# Patient Record
Sex: FEMALE | Race: WHITE | NOT HISPANIC OR LATINO | Employment: FULL TIME | URBAN - METROPOLITAN AREA
[De-identification: names, ages, dates, MRNs, and addresses within clinical notes are randomized per-mention and may not be internally consistent; named-entity substitution may affect disease eponyms.]

---

## 2023-12-13 ENCOUNTER — EH NON-PROVIDER (OUTPATIENT)
Dept: OCCUPATIONAL MEDICINE | Facility: CLINIC | Age: 23
End: 2023-12-13
Payer: COMMERCIAL

## 2023-12-13 ENCOUNTER — HOSPITAL ENCOUNTER (OUTPATIENT)
Facility: MEDICAL CENTER | Age: 23
End: 2023-12-13
Attending: PREVENTIVE MEDICINE
Payer: COMMERCIAL

## 2023-12-13 ENCOUNTER — EMPLOYEE HEALTH (OUTPATIENT)
Dept: OCCUPATIONAL MEDICINE | Facility: CLINIC | Age: 23
End: 2023-12-13
Payer: COMMERCIAL

## 2023-12-13 VITALS
TEMPERATURE: 97.3 F | HEIGHT: 63 IN | HEART RATE: 86 BPM | RESPIRATION RATE: 16 BRPM | OXYGEN SATURATION: 98 % | WEIGHT: 175 LBS | DIASTOLIC BLOOD PRESSURE: 62 MMHG | SYSTOLIC BLOOD PRESSURE: 100 MMHG | BODY MASS INDEX: 31.01 KG/M2

## 2023-12-13 DIAGNOSIS — Z02.89 VISIT FOR OCCUPATIONAL HEALTH EXAMINATION: ICD-10-CM

## 2023-12-13 DIAGNOSIS — Z02.89 VISIT FOR OCCUPATIONAL HEALTH EXAMINATION: Primary | ICD-10-CM

## 2023-12-13 LAB
AMP AMPHETAMINE: NORMAL
BAR BARBITURATES: NORMAL
BZO BENZODIAZEPINES: NORMAL
COC COCAINE: NORMAL
INT CON NEG: NORMAL
INT CON POS: NORMAL
MDMA ECSTASY: NORMAL
MET METHAMPHETAMINES: NORMAL
MTD METHADONE: NORMAL
OPI OPIATES: NORMAL
OXY OXYCODONE: NORMAL
PCP PHENCYCLIDINE: NORMAL
POC URINE DRUG SCREEN OCDRS: NORMAL
THC: NORMAL

## 2023-12-13 PROCEDURE — 8915 PR COMPREHENSIVE PHYSICAL: Performed by: PREVENTIVE MEDICINE

## 2023-12-13 PROCEDURE — 80305 DRUG TEST PRSMV DIR OPT OBS: CPT | Performed by: PREVENTIVE MEDICINE

## 2023-12-13 PROCEDURE — 86787 VARICELLA-ZOSTER ANTIBODY: CPT | Performed by: PREVENTIVE MEDICINE

## 2023-12-13 PROCEDURE — 86480 TB TEST CELL IMMUN MEASURE: CPT | Performed by: PREVENTIVE MEDICINE

## 2023-12-13 PROCEDURE — 90715 TDAP VACCINE 7 YRS/> IM: CPT | Performed by: NURSE PRACTITIONER

## 2023-12-13 NOTE — PROGRESS NOTES
Pre Employment Drug Screen Completed.   Tb & VZV draw  Tdap admin  Docs: MMR and Hep B   Signed COVID declination since no proof was brought in and is not sure if she can get it.   No mask fit is required at this time.

## 2023-12-15 LAB
GAMMA INTERFERON BACKGROUND BLD IA-ACNC: 0.04 IU/ML
M TB IFN-G BLD-IMP: NEGATIVE
M TB IFN-G CD4+ BCKGRND COR BLD-ACNC: 0 IU/ML
MITOGEN IGNF BCKGRD COR BLD-ACNC: 9.72 IU/ML
QFT TB2 - NIL TBQ2: -0.01 IU/ML

## 2023-12-16 LAB — VZV IGG SER IA-ACNC: 1366 IV

## 2024-02-12 ENCOUNTER — HOSPITAL ENCOUNTER (OUTPATIENT)
Facility: MEDICAL CENTER | Age: 24
End: 2024-02-12
Attending: OBSTETRICS & GYNECOLOGY
Payer: COMMERCIAL

## 2024-02-12 ENCOUNTER — INITIAL PRENATAL (OUTPATIENT)
Dept: OBGYN | Facility: CLINIC | Age: 24
End: 2024-02-12
Payer: COMMERCIAL

## 2024-02-12 VITALS — DIASTOLIC BLOOD PRESSURE: 67 MMHG | WEIGHT: 175 LBS | SYSTOLIC BLOOD PRESSURE: 108 MMHG | BODY MASS INDEX: 31 KG/M2

## 2024-02-12 DIAGNOSIS — Z34.90 PREGNANCY, UNSPECIFIED GESTATIONAL AGE: ICD-10-CM

## 2024-02-12 DIAGNOSIS — O21.0 MORNING SICKNESS: ICD-10-CM

## 2024-02-12 DIAGNOSIS — F32.A ANXIETY AND DEPRESSION: ICD-10-CM

## 2024-02-12 DIAGNOSIS — F41.9 ANXIETY AND DEPRESSION: ICD-10-CM

## 2024-02-12 DIAGNOSIS — Z12.4 SCREENING FOR CERVICAL CANCER: ICD-10-CM

## 2024-02-12 PROCEDURE — 3074F SYST BP LT 130 MM HG: CPT | Performed by: OBSTETRICS & GYNECOLOGY

## 2024-02-12 PROCEDURE — 87491 CHLMYD TRACH DNA AMP PROBE: CPT

## 2024-02-12 PROCEDURE — 88142 CYTOPATH C/V THIN LAYER: CPT

## 2024-02-12 PROCEDURE — 76830 TRANSVAGINAL US NON-OB: CPT | Performed by: OBSTETRICS & GYNECOLOGY

## 2024-02-12 PROCEDURE — 87591 N.GONORRHOEAE DNA AMP PROB: CPT

## 2024-02-12 PROCEDURE — 3078F DIAST BP <80 MM HG: CPT | Performed by: OBSTETRICS & GYNECOLOGY

## 2024-02-12 PROCEDURE — 0500F INITIAL PRENATAL CARE VISIT: CPT | Performed by: OBSTETRICS & GYNECOLOGY

## 2024-02-12 RX ORDER — ONDANSETRON 4 MG/1
4 TABLET, FILM COATED ORAL EVERY 4 HOURS PRN
Qty: 20 TABLET | Refills: 1 | Status: SHIPPED | OUTPATIENT
Start: 2024-02-12

## 2024-02-12 RX ORDER — FERROUS SULFATE 325(65) MG
325 TABLET ORAL DAILY
COMMUNITY

## 2024-02-12 ASSESSMENT — EDINBURGH POSTNATAL DEPRESSION SCALE (EPDS)
I HAVE BEEN SO UNHAPPY THAT I HAVE HAD DIFFICULTY SLEEPING: NOT VERY OFTEN
TOTAL SCORE: 14
I HAVE FELT SAD OR MISERABLE: NOT VERY OFTEN
I HAVE BLAMED MYSELF UNNECESSARILY WHEN THINGS WENT WRONG: YES, MOST OF THE TIME
THINGS HAVE BEEN GETTING ON TOP OF ME: YES, SOMETIMES I HAVEN'T BEEN COPING AS WELL AS USUAL
I HAVE LOOKED FORWARD WITH ENJOYMENT TO THINGS: AS MUCH AS I EVER DID
THE THOUGHT OF HARMING MYSELF HAS OCCURRED TO ME: HARDLY EVER
I HAVE FELT SCARED OR PANICKY FOR NO GOOD REASON: YES, SOMETIMES
I HAVE BEEN ABLE TO LAUGH AND SEE THE FUNNY SIDE OF THINGS: NOT QUITE SO MUCH NOW
I HAVE BEEN ANXIOUS OR WORRIED FOR NO GOOD REASON: YES, VERY OFTEN
I HAVE BEEN SO UNHAPPY THAT I HAVE BEEN CRYING: NO, NEVER

## 2024-02-12 NOTE — PROGRESS NOTES
Establish Pregnancy Visit    CC: First OB Visit    HPI: Patient is a 23 y.o.  at 9w3d who presents for her first OB visit.  She is not yet feeling fetal movement. She denies vaginal bleeding, denies leakage of fluid, denies contractions. She reports some nausea and vomiting and would like a prescription for nausea medication. A script for zofran was provided, she was counseled to take unisom and B6 at night. She denies headaches or urinary symptoms.     DATING:   Estimated Date of Delivery: 2024 Dated by 9w3d ultrasound performed today (unknown LMP)    GYN HX:   Last Pap: Performed today, she has not had a prior pap  Hx Moderate or Severe Dysplasia : no  Hx STD : no    OBSTETRIC HISTORY:  OB History    Para Term  AB Living   3 2 2     2   SAB IAB Ectopic Molar Multiple Live Births             2      # Outcome Date GA Lbr Eyad/2nd Weight Sex Delivery Anes PTL Lv   3 Current            2 Term 22 38w0d  7 lb 6 oz F Vag-Spont None  DIANE      Birth Comments: Heavy bleeding when delivering placenta   1 Term 21 40w4d  6 lb 7 oz M Vag-Spont None  DIANE       MEDICAL HISTORY:  Past Medical History:   Diagnosis Date    Vaginismus        MEDICATIONS:  Current Outpatient Medications on File Prior to Visit   Medication Sig Dispense Refill    Prenatal MV-Min-Fe Fum-FA-DHA (PRENATAL 1 PO) Take  by mouth.      ferrous sulfate 325 (65 Fe) MG tablet Take 325 mg by mouth every day.       No current facility-administered medications on file prior to visit.       FAMILY HISTORY:  Family History   Problem Relation Age of Onset    No Known Problems Mother     No Known Problems Father        SURGICAL HISTORY:  Past Surgical History:   Procedure Laterality Date    DENTAL EXTRACTION(S)  2016    [place] teeth removal       ALLERGIES / REACTIONS:  Allergies   Allergen Reactions    Amoxicillin      Hives                SOCIAL HISTORY:   reports that she has never smoked. She has never used smokeless tobacco.  She reports that she does not drink alcohol and does not use drugs.    ROS:   Gen: no fevers or chills, no significant weight loss or gain, excessive fatigue  Respiratory:  no cough or dyspnea  Cardiac:  no chest pain, no palpitations, no syncope  Breast: no breast discharge, pain, lump or skin changes  GI:  no heartburn, no abdominal pain, no nausea or vomiting  Urinary: no dysuria, urgency, frequency, incontinence   Psych: no depression or anxiety  Neuro: no migraines with aura, fainting spells, numbness or tingling  Extremities: no joint pain, persistently swollen ankles, recurrent leg cramps     PHYSICAL EXAMINATION:  Vital Signs:   Vitals:    02/12/24 1128   BP: 108/67   Weight: 175 lb     Body mass index is 31 kg/m².  Constitutional: The patient is well developed and well nourished.  Psychiatric: Patient is oriented to time place and person.   Skin: No rash observed.  Neck: Neck appears symmetric. There are no masses or adenopathy present.  Respiratory: normal effort  Abdomen: Soft, non-tender.  Pelvic:    Vulva: normal.    Urethra: normal.   Vagina: normal.    Cervix: normal.    Uterus: consistent with dates    Adnexa: normal.   Perineum: normal.   GC / Chlamydia cultures obtained.   Pap Smear Obtained: yes  Transvaginal ultrasound performed today. A single live intrauterine pregnancy was identified with . CRL measured 9w3d which gives an EDC of 9/13/2024. No obvious adnexal masses were identified.   Extremeties: Legs are symmetric and without tenderness. There is no edema present.    ACOG SCREENING  Infection Prevention  1. High Risk For HIV: No 6. Rash Or Illness Since LMP: No     2. High Risk For Hepatitis B or C: No 7. History Of STD, GC, Chlamydia, HPV Syphilis: No     3. Live With Someone With TB Or Exposed To TB: No 8. Have a cat in the home?: No     4. Patient Or Partner Has A History Of Herpes: No 8a. Responsible for changing the litter?: No     5. History of Chicken Pox: Yes (Comment: MOB) 9.  Other (See Comments Below): No   Comments: Pregnancy was unplanned but desired.            Genetic Screening/Teratology Counseling- Includes patient, baby's father, or anyone in either family with:  Patient's age 35 years or older as of estimated date of delivery: No     Thalassemia (Italian, Greek, Mediterranean, or  background): MCV less than 80: No     Neural tube defect (Meningomyelocele, Spina bifida, or Anencephaly): No     Congenital heart defect: No     Down syndrome: No     Negro-Sachs (Ashkenazi Jehovah's witness, Cajun, French R[place]an): No     Canavan disease (Ashkenazi Jehovah's witness): No     Familial dysautonomia (Ashkenazi Jehovah's witness): No     Sickle cell disease or trait (): No     Hemophilia or other blood disorders: No     Muscular dystrophy: No    Cystic fibrosis: No     Brielle's chorea: No     Mental retardation/autism: Yes (Comment: MOB - full brother)     Other inherited genetic or chromosomal disorder: No     Maternal metabolic disorder (eg. Type 1 diabetes, PKU): No     Patient or baby's father had child with birth defects not listed above: No     Recurrent pregnancy loss, or a stillbirth: No     Medications (including supplements, vitamins, herbs, or OTC drugs)/illicit/recreational drugs/alcohol since last menstrual period: No                 ASSESSMENT AND PLAN:  23 y.o.  at 9w3d    1. Pregnancy, unspecified gestational age  - PREG CNTR PRENATAL PN; Future  - URINE DRUG SCREEN W/CONF (AR); Future  - US-OB 2ND 3RD TRI COMPLETE; Future  - URINE CULTURE; Future    2. Screening for cervical cancer  - THINPREP PAP W/CTNG; Future    3. Morning sickness  - ondansetron (ZOFRAN) 4 MG Tab tablet; Take 1 Tablet by mouth every four hours as needed for Nausea/Vomiting.  Dispense: 20 Tablet; Refill: 1    4. Anxiety and depression  - She was previously on medication for depression and anxiety and then discontinued when she found out she was pregnant. EDPS score 14, this was not reviewed until the patient had  already left the visit. I called her and it went straight to voicemail so I sent a [employer] message asking about her prior medication to see if it's a recommended one in pregnancy. If it's not, I'll refer her to behavioral health or prescribe a medication that's appropriate for pregnancy fi she wishes to restart medication     - PNL ordered and std screening completed   - Dating reviewed: Dated by 9wk3 US  - Discussed options for genetic/aneuploidy testing and information given for pt to consider.  Advised to call insurance for cost of testing.           - she currently declines aneuploidy testing.           - she currently declines CF/SMA testing.  - Discussed recommendation for flu, Covid vaccine during pregnancy  - Discussed office policies, prenatal care timeline, weight gain, diet and activity.  - Taking PNV.  - Increase water intake and encouraged healthy nutrition. Encouraged moderate exercise may continue into final trimester.     Return in 4 weeks for next prenatal visit    Matt Cunningham M.D.

## 2024-02-12 NOTE — PROGRESS NOTES
Pt here for NOB apt.   Last pap : Never   LMP = 11/23/23   MAYO = 8/29/24 by LMP  Est GA = 11W4D  EPDS = 14 - Provider notified   Pt is not interested in genetic testing.   Pt states she has no concerns.   Phone/Pharm verified.

## 2024-02-13 DIAGNOSIS — Z12.4 SCREENING FOR CERVICAL CANCER: ICD-10-CM

## 2024-02-14 DIAGNOSIS — F32.A ANXIETY AND DEPRESSION: ICD-10-CM

## 2024-02-14 DIAGNOSIS — F41.9 ANXIETY AND DEPRESSION: ICD-10-CM

## 2024-02-14 RX ORDER — BUPROPION HYDROCHLORIDE 75 MG/1
75 TABLET ORAL 2 TIMES DAILY
Qty: 60 TABLET | Refills: 1 | Status: SHIPPED | OUTPATIENT
Start: 2024-02-14

## 2024-02-17 LAB
C TRACH RRNA CVX QL NAA+PROBE: NEGATIVE
CYTOLOGIST CVX/VAG CYTO: NORMAL
CYTOLOGY CVX/VAG DOC CYTO: NORMAL
N GONORRHOEA RRNA CVX QL NAA+PROBE: NEGATIVE
NOTE NL11727A: NORMAL
OTHER STN SPEC: NORMAL
STAT OF ADQ CVX/VAG CYTO-IMP: NORMAL

## 2024-06-05 ENCOUNTER — TELEPHONE (OUTPATIENT)
Dept: OBGYN | Facility: CLINIC | Age: 24
End: 2024-06-05
Payer: COMMERCIAL

## 2024-06-06 NOTE — TELEPHONE ENCOUNTER
Called pt to see if she has a different provider since we have not seen her since her initial prenatal appt on 2/12/2024.     06/05/24  1711 Left message for pt to call back regarding above message.   [employer] message sent.   1728 pt responded to [place] message ns seen a home birth midwife.

## 2025-05-30 ENCOUNTER — OFFICE VISIT (OUTPATIENT)
Dept: URGENT CARE | Facility: PHYSICIAN GROUP | Age: 25
End: 2025-05-30
Payer: COMMERCIAL

## 2025-05-30 ENCOUNTER — RESULTS FOLLOW-UP (OUTPATIENT)
Dept: URGENT CARE | Facility: PHYSICIAN GROUP | Age: 25
End: 2025-05-30

## 2025-05-30 VITALS
HEART RATE: 105 BPM | DIASTOLIC BLOOD PRESSURE: 86 MMHG | BODY MASS INDEX: 30.22 KG/M2 | WEIGHT: 177 LBS | HEIGHT: 64 IN | RESPIRATION RATE: 16 BRPM | TEMPERATURE: 97.8 F | SYSTOLIC BLOOD PRESSURE: 104 MMHG | OXYGEN SATURATION: 99 %

## 2025-05-30 DIAGNOSIS — J01.00 ACUTE NON-RECURRENT MAXILLARY SINUSITIS: Primary | ICD-10-CM

## 2025-05-30 LAB
FLUAV RNA SPEC QL NAA+PROBE: NEGATIVE
FLUBV RNA SPEC QL NAA+PROBE: NEGATIVE
RSV RNA SPEC QL NAA+PROBE: NEGATIVE
S PYO DNA SPEC NAA+PROBE: NOT DETECTED
SARS-COV-2 RNA RESP QL NAA+PROBE: NEGATIVE

## 2025-05-30 RX ORDER — DOXYCYCLINE HYCLATE 100 MG
100 TABLET ORAL 2 TIMES DAILY
Qty: 14 TABLET | Refills: 0 | Status: SHIPPED | OUTPATIENT
Start: 2025-05-30 | End: 2025-06-06

## 2025-05-30 RX ORDER — FLUCONAZOLE 150 MG
TABLET ORAL
COMMUNITY
Start: 2025-04-16

## 2025-05-30 RX ORDER — CEPHALEXIN 500 MG/1
CAPSULE ORAL
COMMUNITY
Start: 2025-05-16

## 2025-05-30 NOTE — PROGRESS NOTES
Chief Complaint   Patient presents with    Body Aches     X1week     Sinusitis       CC:  cough        Cough  This is a new problem. The current episode started 7 days ago. The problem has been unchanged. The problem occurs constantly. The cough is dry. Associated symptoms include : fatigue, headache, sinus congestion, nasal d/c,  Subj.  fever. Pertinent negatives include no    , nausea, vomiting, diarrhea, sweats, weight loss or wheezing. Nothing aggravates the symptoms.  Patient has tried nothing for the symptoms. There is no history of asthma.              Past Medical History:   Diagnosis Date    Vaginismus 2019         Social History     Socioeconomic History    Marital status:      Spouse name: Not on file    Number of children: Not on file    Years of education: Not on file    Highest education level: Not on file   Occupational History    Not on file   Tobacco Use    Smoking status: Never    Smokeless tobacco: Never   Vaping Use    Vaping status: Never Used   Substance and Sexual Activity    Alcohol use: Never    Drug use: Never    Sexual activity: Yes     Partners: Male     Birth control/protection: None, Condom   Other Topics Concern    Not on file   Social History Narrative    Not on file     Social Drivers of Health     Financial Resource Strain: Not on file   Food Insecurity: Not on file   Transportation Needs: Not on file   Physical Activity: Not on file   Stress: Not on file   Social Connections: Not on file   Intimate Partner Violence: Not on file   Housing Stability: Not on file                 Review of Systems   Constitutional: Negative for   chills and malaise/fatigue.   Eyes: Negative for vision changes, d/c.    Respiratory: + for cough and sputum production.    Cardiovascular: Negative for chest pain and palpitations.   Gastrointestinal: Negative for nausea, vomiting, abdominal pain, diarrhea and constipation.   Genitourinary: Negative for dysuria, urgency and frequency.   Skin: Negative  "for rash or  itching.   Neurological: Negative for dizziness and tingling.    Psychiatric/Behavioral: Negative for depression.   Hematologic/lymphatic - denies bruising or excessive bleeding  All other systems reviewed and are negative.                   Objective:     /86   Pulse (!) 105   Temp 36.6 °C (97.8 °F) (Temporal)   Resp 16   Ht 1.626 m (5' 4\")   Wt 80.3 kg (177 lb)   SpO2 99%       Physical Exam   Constitutional: patient is oriented to person, place, and time. Patient appears well-developed and well-nourished. No distress.   HENT:   Head: Normocephalic and atraumatic.   Right Ear: External ear normal.   Left Ear: External ear normal.   Nose: Mucosal edema  present. + bilat sinus TTP        Mouth/Throat: Mucous membranes are normal. No oral lesions.  No posterior pharyngeal erythema.  No oropharyngeal exudate or posterior oropharyngeal edema.   Eyes: Conjunctivae and EOM are normal. Pupils are equal, round, and reactive to light. Right eye exhibits no discharge. Left eye exhibits no discharge. No scleral icterus.   Neck: Normal range of motion. Neck supple. No tracheal deviation present.   Cardiovascular: Normal rate, regular rhythm and normal heart sounds.  Exam reveals no friction rub.    Pulmonary/Chest: Effort normal. No respiratory distress. Patient has no wheezes or rhonchi. Patient has no rales.    Musculoskeletal:  exhibits no edema.   Lymphadenopathy:     Patient has no cervical adenopathy.      Neurological: patient is alert and oriented to person, place, and time.   Skin: Skin is warm and dry. No rash noted. No erythema.   Psychiatric: patient  has a normal mood and affect.  behavior is normal.   Nursing note and vitals reviewed.       A/P:      1. Acute non-recurrent maxillary sinusitis (Primary)   PCR negative for COVID, influenza A/B, RSV, strep throat.      1. Acute non-recurrent maxillary sinusitis (Primary)        1. Acute non-recurrent maxillary sinusitis (Primary)  PCR " "negative for COVID, influenza A/B, RSV, strep throat.    PCN-allergic    Rx doxycycline    Pt currently breastfeeding - advised to \"pump and dump\" to maintain supply and prevent infant exposure to doxy       .Differential diagnosis, natural history, supportive care, and indications for immediate follow-up discussed. All questions answered. Patient agrees with the plan of care.     Follow-up as needed if symptoms worsen or fail to improve to PCP, Urgent care or Emergency Room.     I have personally reviewed prior external notes and test results pertinent to today's visit.  I have independently reviewed and interpreted all diagnostics ordered during this urgent care acute visit.     "